# Patient Record
Sex: FEMALE | Race: WHITE | NOT HISPANIC OR LATINO | URBAN - METROPOLITAN AREA
[De-identification: names, ages, dates, MRNs, and addresses within clinical notes are randomized per-mention and may not be internally consistent; named-entity substitution may affect disease eponyms.]

---

## 2024-06-14 ENCOUNTER — OFFICE VISIT (OUTPATIENT)
Dept: HEMATOLOGY/ONCOLOGY | Facility: CLINIC | Age: 64
End: 2024-06-14
Payer: COMMERCIAL

## 2024-06-14 VITALS
TEMPERATURE: 98.1 F | HEART RATE: 85 BPM | HEIGHT: 65 IN | BODY MASS INDEX: 28.21 KG/M2 | SYSTOLIC BLOOD PRESSURE: 131 MMHG | RESPIRATION RATE: 16 BRPM | OXYGEN SATURATION: 95 % | DIASTOLIC BLOOD PRESSURE: 75 MMHG | WEIGHT: 169.31 LBS

## 2024-06-14 DIAGNOSIS — I82.401 DEEP VEIN THROMBOSIS (DVT) OF RIGHT LOWER EXTREMITY, UNSPECIFIED CHRONICITY, UNSPECIFIED VEIN (MULTI): Primary | ICD-10-CM

## 2024-06-14 PROCEDURE — 1159F MED LIST DOCD IN RCRD: CPT | Performed by: INTERNAL MEDICINE

## 2024-06-14 PROCEDURE — 99205 OFFICE O/P NEW HI 60 MIN: CPT | Performed by: INTERNAL MEDICINE

## 2024-06-14 PROCEDURE — 1126F AMNT PAIN NOTED NONE PRSNT: CPT | Performed by: INTERNAL MEDICINE

## 2024-06-14 PROCEDURE — 99215 OFFICE O/P EST HI 40 MIN: CPT | Performed by: INTERNAL MEDICINE

## 2024-06-14 RX ORDER — LEVOTHYROXINE SODIUM 112 UG/1
CAPSULE ORAL DAILY
COMMUNITY

## 2024-06-14 ASSESSMENT — COLUMBIA-SUICIDE SEVERITY RATING SCALE - C-SSRS
6. HAVE YOU EVER DONE ANYTHING, STARTED TO DO ANYTHING, OR PREPARED TO DO ANYTHING TO END YOUR LIFE?: NO
1. IN THE PAST MONTH, HAVE YOU WISHED YOU WERE DEAD OR WISHED YOU COULD GO TO SLEEP AND NOT WAKE UP?: NO
2. HAVE YOU ACTUALLY HAD ANY THOUGHTS OF KILLING YOURSELF?: NO

## 2024-06-14 ASSESSMENT — PATIENT HEALTH QUESTIONNAIRE - PHQ9
2. FEELING DOWN, DEPRESSED OR HOPELESS: NOT AT ALL
SUM OF ALL RESPONSES TO PHQ9 QUESTIONS 1 AND 2: 0
1. LITTLE INTEREST OR PLEASURE IN DOING THINGS: NOT AT ALL

## 2024-06-14 ASSESSMENT — PAIN SCALES - GENERAL: PAINLEVEL: 0-NO PAIN

## 2024-06-14 NOTE — PROGRESS NOTES
"Patient ID: : Ashlyn Vance            Primary Care Provider: Conor Shen MD    LOCATION:  [facility]    REFERRING PHYSICIAN:  Conor Shen MD    HEMATOLOGY ONCOLOGY PROBLEMS:  Unprovoked, left lower leg (popliteal, tibial, peroneal) vein DVT in 2024    CHIEF COMPLAINTS:  Patient is in the clinic today for evaluation of DVT    HISTORY:  Ashlyn Vance is a 64 y.o. female with a history of unprovoked left lower leg DVT.  She started having problems with fairly sudden onset of ankle and calf swelling in 2024.  Further evaluation including an ultrasound from 2024 done at [facility] was consists from a tree of left popliteal, tibial and peroneal vein DVT.  She has been maintained on anticoagulation with Eliquis since then.  There was no history of any recent surgeries, medication changes, decrease physical activity, travels or immobility.  Family history as such as unremarkable.  Patient herself never had any previous issues with thromboembolic complication all her life.    INTERVAL HISTORY:  Patient denies any new complaints.  Left leg swelling and discomfort is improved.  She is tolerating Eliquis well.  No history of nausea, vomiting, fever, night sweats, diarrhea, rash, anorexia or weight loss. No recent changes in medications.    PAST MEDICAL HISTORY:  1.  Left lower leg DVT as detailed above.  2.  Hypothyroidism  3.  Hyperlipidemia  4.  History of tubal ligation.    SOCIAL HISTORY:   for 45 years and lives in [place].  Non-smoker.  Social alcohol intake.  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  Born and raised in [place].    FAMILY HISTORY:  Father  in his 40s from myocardial infarction.  Mother  in her 70s from throat cancer.  1 sister has a history of A-fib and is on Coumadin.  2 children and 6 grandkids all alive and well.  One of her nephew has a history of \"sticky blood\" -  Exact details are not " "available.  No other family history of bleeding, clotting or malignant disorders in the family history.    REVIEW OF SYSTEMS:   Pertinent finding as per the history above.  All other systems have been reviewed and generally negative and noncontributory.    VITAL SIGNS  BSA: 1.88 meters squared  /75   Pulse 85   Temp 36.7 °C (98.1 °F)   Resp 16   Ht 1.654 m (5' 5.12\")   Wt 76.8 kg (169 lb 5 oz)   SpO2 95%   BMI 28.07 kg/m²     PHYSICAL EXAMINATION:  Detailed physical examination not done.    LAB DATA:  No results available but as per the patient she has been noted to have normal yearly routine health maintenance blood work all along.    RADIOLOGICAL DATA:  Doppler ultrasound results from 4/18/2024 from [facility] were reviewed and have been detailed in the history above.    ASSESSMENT / PLAN:  Unprovoked, left lower leg (popliteal, tibial, peroneal) vein DVT in April 2024.  Please refer the details  as outlined above.  In summary, patient with finding of unexplained left lower leg DVT as outlined above.  There was no history of recent surgeries, procedures, immobility, prolonged travels, medication changes etc.  Family history is unremarkable.  She is maintained on Eliquis without any problems.    Long discussion with the patient and she is explained that for now we will have to maintain her on anticoagulation for at least about 2 more months.  Thereafter we will recheck the Doppler ultrasound and if there is significant improvement in the clot burden then we will try to hold Eliquis for few days to check complete hypercoagulable workup.  She is explained that anticoagulation is preventing clot progression or development of any new thromboembolic issues but body own antifibrinolytic system will dissolve the clot over time, (3-6 months).  In view of her history of unprovoked unexplained DVT, it is important to rule out complete hypercoagulable workup.  But those labs showed only be checked " in the absence of acute thromboembolic episodes and without anticoagulation.  Depending upon her clinical course and the results of the hypercoagulable workup, we will decide about the need for long-term anticoagulation.    2.  Follow-up.  She will be scheduled for follow-up Doppler ultrasound in about 2 months.  Depending upon the results we will plan further management accordingly as outlined above.    A total of 60 minutes were spent in evaluation - performing physical examination, history taking, review of the previous extensive records/information and formulating current and future management plan. Majority of the time was spend in consultation and discussion.    This note has been transcribed using Dragon voice recognition system and there is a possibility of unintentional typing misprints.

## 2024-08-14 ENCOUNTER — HOSPITAL ENCOUNTER (OUTPATIENT)
Dept: VASCULAR MEDICINE | Facility: HOSPITAL | Age: 64
Discharge: HOME | End: 2024-08-14
Payer: COMMERCIAL

## 2024-08-14 DIAGNOSIS — I82.531: ICD-10-CM

## 2024-08-14 DIAGNOSIS — I82.401 DEEP VEIN THROMBOSIS (DVT) OF RIGHT LOWER EXTREMITY, UNSPECIFIED CHRONICITY, UNSPECIFIED VEIN (MULTI): ICD-10-CM

## 2024-08-14 PROCEDURE — 93970 EXTREMITY STUDY: CPT

## 2024-08-14 PROCEDURE — 93970 EXTREMITY STUDY: CPT | Performed by: INTERNAL MEDICINE

## 2024-08-26 DIAGNOSIS — I82.401 DEEP VEIN THROMBOSIS (DVT) OF RIGHT LOWER EXTREMITY, UNSPECIFIED CHRONICITY, UNSPECIFIED VEIN (MULTI): Primary | ICD-10-CM

## 2024-08-28 ENCOUNTER — TELEPHONE (OUTPATIENT)
Dept: HEMATOLOGY/ONCOLOGY | Facility: CLINIC | Age: 64
End: 2024-08-28
Payer: COMMERCIAL

## 2024-08-28 DIAGNOSIS — I82.401 DEEP VEIN THROMBOSIS (DVT) OF RIGHT LOWER EXTREMITY, UNSPECIFIED CHRONICITY, UNSPECIFIED VEIN (MULTI): Primary | ICD-10-CM

## 2024-08-28 NOTE — TELEPHONE ENCOUNTER
Pt called to review her most recent scan and what is needed at this time. Per Dr. Levine the pt should continue to take her prescribed blood thinner and repeat doppler in 3 months. She will also need to schedule a follow up with Dr. Levine shortly after the November doppler. Pt verbalizes understanding and will call with any other questions or concerns.

## 2024-10-02 DIAGNOSIS — I82.401 DEEP VEIN THROMBOSIS (DVT) OF RIGHT LOWER EXTREMITY, UNSPECIFIED CHRONICITY, UNSPECIFIED VEIN (MULTI): Primary | ICD-10-CM

## 2024-11-18 PROBLEM — R87.619 ABNORMAL PAP SMEAR OF CERVIX: Status: ACTIVE | Noted: 2024-11-18

## 2024-11-18 RX ORDER — CHOLECALCIFEROL (VITAMIN D3) 125 MCG
CAPSULE ORAL
COMMUNITY

## 2024-11-18 RX ORDER — MULTIVITAMIN
TABLET ORAL
COMMUNITY

## 2024-12-02 ENCOUNTER — HOSPITAL ENCOUNTER (OUTPATIENT)
Dept: VASCULAR MEDICINE | Facility: HOSPITAL | Age: 64
Discharge: HOME | End: 2024-12-02
Payer: MEDICARE

## 2024-12-02 DIAGNOSIS — I82.401 DEEP VEIN THROMBOSIS (DVT) OF RIGHT LOWER EXTREMITY, UNSPECIFIED CHRONICITY, UNSPECIFIED VEIN (MULTI): ICD-10-CM

## 2024-12-02 DIAGNOSIS — I82.531 CHRONIC EMBOLISM AND THROMBOSIS OF RIGHT POPLITEAL VEIN: ICD-10-CM

## 2024-12-02 PROCEDURE — 93970 EXTREMITY STUDY: CPT

## 2024-12-02 PROCEDURE — 93970 EXTREMITY STUDY: CPT | Performed by: INTERNAL MEDICINE

## 2024-12-09 ENCOUNTER — APPOINTMENT (OUTPATIENT)
Dept: HEMATOLOGY/ONCOLOGY | Facility: CLINIC | Age: 64
End: 2024-12-09
Payer: COMMERCIAL

## 2024-12-11 ENCOUNTER — OFFICE VISIT (OUTPATIENT)
Dept: HEMATOLOGY/ONCOLOGY | Facility: CLINIC | Age: 64
End: 2024-12-11
Payer: MEDICARE

## 2024-12-11 VITALS
TEMPERATURE: 97.3 F | RESPIRATION RATE: 20 BRPM | SYSTOLIC BLOOD PRESSURE: 142 MMHG | OXYGEN SATURATION: 97 % | BODY MASS INDEX: 29.97 KG/M2 | WEIGHT: 180.78 LBS | DIASTOLIC BLOOD PRESSURE: 86 MMHG | HEART RATE: 82 BPM

## 2024-12-11 DIAGNOSIS — I82.401 DEEP VEIN THROMBOSIS (DVT) OF RIGHT LOWER EXTREMITY, UNSPECIFIED CHRONICITY, UNSPECIFIED VEIN (MULTI): Primary | ICD-10-CM

## 2024-12-11 PROCEDURE — 99213 OFFICE O/P EST LOW 20 MIN: CPT | Performed by: INTERNAL MEDICINE

## 2024-12-11 PROCEDURE — 1159F MED LIST DOCD IN RCRD: CPT | Performed by: INTERNAL MEDICINE

## 2024-12-11 PROCEDURE — 1036F TOBACCO NON-USER: CPT | Performed by: INTERNAL MEDICINE

## 2024-12-11 PROCEDURE — 1126F AMNT PAIN NOTED NONE PRSNT: CPT | Performed by: INTERNAL MEDICINE

## 2024-12-11 ASSESSMENT — PAIN SCALES - GENERAL: PAINLEVEL_OUTOF10: 0-NO PAIN

## 2024-12-11 NOTE — PROGRESS NOTES
"Patient ID: : Ashlyn Vance            Primary Care Provider: Conor Shen MD    LOCATION:  [facility]    HEMATOLOGY ONCOLOGY PROBLEMS:  Unprovoked, left lower leg (popliteal, tibial, peroneal) vein DVT in 2024    CHIEF COMPLAINTS:  Patient is in the clinic today for follow-up of DVT.    HISTORY:  Ashlyn Vance is a 64 y.o. female with a history of unprovoked left lower leg DVT.  She started having problems with fairly sudden onset of ankle and calf swelling in 2024.  Further evaluation including an ultrasound from 2024 done at [facility] was confirmatory of left popliteal, tibial and peroneal vein DVT.  She has been maintained on anticoagulation with Eliquis since then.  There was no history of any recent surgeries, medication changes, decrease physical activity, travels or immobility.  Family history as such as unremarkable.  Patient herself never had any previous issues with thromboembolic complication all her life.    INTERVAL HISTORY:  Patient denies any new complaints.  Left leg swelling and discomfort has improved.  She is tolerating Eliquis well.  Latest follow-up Doppler ultrasound showed complete resolution of previous DVT.  No history of nausea, vomiting, fever, night sweats, diarrhea, rash, anorexia or weight loss. No recent changes in medications.    PAST MEDICAL HISTORY:  1.  Left lower leg DVT as detailed above.  2.  Hypothyroidism  3.  Hyperlipidemia  4.  History of tubal ligation.    SOCIAL HISTORY:   for 45 years and lives in [place].  Non-smoker.  Social alcohol intake.  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  Born and raised in [place].    FAMILY HISTORY:  Father  in his 40s from myocardial infarction.  Mother  in her 70s from throat cancer.  1 sister has a history of A-fib and is on Coumadin.  2 children and 6 grandkids all alive and well.  One of her nephew has a history of \"sticky blood\" -  " Exact details are not available.  No other family history of bleeding, clotting or malignant disorders in the family history.    REVIEW OF SYSTEMS:   Pertinent finding as per the history above.  All other systems have been reviewed and generally negative and noncontributory.    VITAL SIGNS  BSA: 1.94 meters squared  /86   Pulse 82   Temp 36.3 °C (97.3 °F)   Resp 20   Wt 82 kg (180 lb 12.4 oz)   SpO2 97%   BMI 29.97 kg/m²     PHYSICAL EXAMINATION:  Detailed physical examination not done.    LAB DATA:  No results available but as per the patient she has been noted to have normal yearly routine health maintenance blood work all along.    RADIOLOGICAL DATA:  Doppler ultrasound 12/02/2024.  CONCLUSIONS:  Right Lower Venous Insufficiency: There is reflux noted in the popliteal vein.  Right Lower Venous: No evidence of acute deep vein thrombus visualized in the right lower extremity. There are chronic changes visualized in the popliteal, posterior tibial and peroneal veins.  Left Lower Venous: No evidence of acute deep vein thrombus visualized in the left lower extremity. Cannot rule out thrombus in non-visualized peroneal vein due to edema.  Comparison:  Compared with study from 8/14/2024, Chronic clot previously documented at Left POP V is not longer visualized. No significant changes to Right.    ASSESSMENT / PLAN:  Unprovoked, left lower leg (popliteal, tibial, peroneal) vein DVT in April 2024.  Please refer the details  as outlined above.  In summary, patient with finding of unexplained left lower leg DVT as outlined above.  There was no history of recent surgeries, procedures, immobility, prolonged travels, medication changes etc.  Family history is unremarkable.  She is maintained on Eliquis without any problems.    Long discussion with the patient and she is reassured that latest follow-up Doppler ultrasound is suggestive of complete resolution of previously known DVT.  At this time we will try to hold  Eliquis for few days to check complete hypercoagulable workup.  Depending upon the hypercoagulable workup, we will decide about the need for long-term anticoagulation.    2.  Follow-up.  She will come to the clinic early next week for hypercoagulable blood work check and depending upon the results we will contact her accordingly.    This note has been transcribed using Dragon voice recognition system and there is a possibility of unintentional typing misprints.

## 2024-12-16 ENCOUNTER — LAB (OUTPATIENT)
Dept: LAB | Facility: CLINIC | Age: 64
End: 2024-12-16
Payer: MEDICARE

## 2024-12-16 DIAGNOSIS — I82.401 DEEP VEIN THROMBOSIS (DVT) OF RIGHT LOWER EXTREMITY, UNSPECIFIED CHRONICITY, UNSPECIFIED VEIN (MULTI): ICD-10-CM

## 2024-12-16 LAB
ALBUMIN SERPL BCP-MCNC: 4 G/DL (ref 3.4–5)
ALP SERPL-CCNC: 60 U/L (ref 33–136)
ALT SERPL W P-5'-P-CCNC: 19 U/L (ref 7–45)
ANION GAP SERPL CALC-SCNC: 11 MMOL/L (ref 10–20)
AST SERPL W P-5'-P-CCNC: 20 U/L (ref 9–39)
B2 GLYCOPROT1 IGA SER-ACNC: 3.2 U/ML
B2 GLYCOPROT1 IGG SER-ACNC: <1.4 U/ML
B2 GLYCOPROT1 IGM SER-ACNC: 1.5 U/ML
BASOPHILS # BLD AUTO: 0.07 X10*3/UL (ref 0–0.1)
BASOPHILS NFR BLD AUTO: 1.2 %
BILIRUB SERPL-MCNC: 0.4 MG/DL (ref 0–1.2)
BUN SERPL-MCNC: 24 MG/DL (ref 6–23)
CALCIUM SERPL-MCNC: 9.2 MG/DL (ref 8.6–10.3)
CARDIOLIPIN IGA SERPL-ACNC: 5.3 APL U/ML
CARDIOLIPIN IGG SER IA-ACNC: <1.6 GPL U/ML
CARDIOLIPIN IGM SER IA-ACNC: 1.9 MPL U/ML
CHLORIDE SERPL-SCNC: 102 MMOL/L (ref 98–107)
CO2 SERPL-SCNC: 29 MMOL/L (ref 21–32)
CREAT SERPL-MCNC: 0.72 MG/DL (ref 0.5–1.05)
EGFRCR SERPLBLD CKD-EPI 2021: >90 ML/MIN/1.73M*2
EOSINOPHIL # BLD AUTO: 0.42 X10*3/UL (ref 0–0.7)
EOSINOPHIL NFR BLD AUTO: 7 %
ERYTHROCYTE [DISTWIDTH] IN BLOOD BY AUTOMATED COUNT: 14.9 % (ref 11.5–14.5)
GLUCOSE SERPL-MCNC: 84 MG/DL (ref 74–99)
HCT VFR BLD AUTO: 34.2 % (ref 36–46)
HGB BLD-MCNC: 10.7 G/DL (ref 12–16)
IMM GRANULOCYTES # BLD AUTO: 0.01 X10*3/UL (ref 0–0.7)
IMM GRANULOCYTES NFR BLD AUTO: 0.2 % (ref 0–0.9)
LYMPHOCYTES # BLD AUTO: 1.66 X10*3/UL (ref 1.2–4.8)
LYMPHOCYTES NFR BLD AUTO: 27.7 %
MCH RBC QN AUTO: 28.5 PG (ref 26–34)
MCHC RBC AUTO-ENTMCNC: 31.3 G/DL (ref 32–36)
MCV RBC AUTO: 91 FL (ref 80–100)
MONOCYTES # BLD AUTO: 0.55 X10*3/UL (ref 0.1–1)
MONOCYTES NFR BLD AUTO: 9.2 %
NEUTROPHILS # BLD AUTO: 3.28 X10*3/UL (ref 1.2–7.7)
NEUTROPHILS NFR BLD AUTO: 54.7 %
NRBC BLD-RTO: 0 /100 WBCS (ref 0–0)
PLATELET # BLD AUTO: 404 X10*3/UL (ref 150–450)
POTASSIUM SERPL-SCNC: 4.3 MMOL/L (ref 3.5–5.3)
PROT SERPL-MCNC: 6.3 G/DL (ref 6.4–8.2)
RBC # BLD AUTO: 3.76 X10*6/UL (ref 4–5.2)
SODIUM SERPL-SCNC: 138 MMOL/L (ref 136–145)
WBC # BLD AUTO: 6 X10*3/UL (ref 4.4–11.3)

## 2024-12-16 PROCEDURE — 85303 CLOT INHIBIT PROT C ACTIVITY: CPT | Mod: PARLAB

## 2024-12-16 PROCEDURE — 85613 RUSSELL VIPER VENOM DILUTED: CPT | Mod: PARLAB

## 2024-12-16 PROCEDURE — 84075 ASSAY ALKALINE PHOSPHATASE: CPT

## 2024-12-16 PROCEDURE — 85302 CLOT INHIBIT PROT C ANTIGEN: CPT

## 2024-12-16 PROCEDURE — 36415 COLL VENOUS BLD VENIPUNCTURE: CPT

## 2024-12-16 PROCEDURE — 86147 CARDIOLIPIN ANTIBODY EA IG: CPT | Mod: PARLAB

## 2024-12-16 PROCEDURE — 85025 COMPLETE CBC W/AUTO DIFF WBC: CPT

## 2024-12-16 PROCEDURE — 85306 CLOT INHIBIT PROT S FREE: CPT | Mod: PARLAB

## 2024-12-16 PROCEDURE — 86146 BETA-2 GLYCOPROTEIN ANTIBODY: CPT | Mod: PARLAB

## 2024-12-16 PROCEDURE — 85301 ANTITHROMBIN III ANTIGEN: CPT

## 2024-12-17 LAB
PROT C ACT/NOR PPP CHRO: 142 % ACTIVITY (ref 70–150)
PROT S ACT/NOR PPP: 22 % ACTIVITY (ref 64–150)
PROT S FREE AG ACT/NOR PPP IA: 17 % (ref 55–124)

## 2024-12-18 LAB — AT III AG ACT/NOR PPP IA: 93 % (ref 82–136)

## 2024-12-19 LAB
DRVVT SCREEN TO CONFIRM RATIO: 0.81 RATIO
DRVVT/DRVVT CFM NRMLZD PPP-RTO: 0.91 RATIO
DRVVT/DRVVT CFM P DOAC NEUT NORM PPP-RTO: 0.9 RATIO
PROT C AG ACT/NOR PPP IA: >95 % (ref 63–153)

## 2024-12-24 ENCOUNTER — TELEPHONE (OUTPATIENT)
Dept: HEMATOLOGY/ONCOLOGY | Facility: CLINIC | Age: 64
End: 2024-12-24
Payer: MEDICARE

## 2024-12-24 DIAGNOSIS — I82.401 DEEP VEIN THROMBOSIS (DVT) OF RIGHT LOWER EXTREMITY, UNSPECIFIED CHRONICITY, UNSPECIFIED VEIN (MULTI): ICD-10-CM

## 2024-12-24 NOTE — TELEPHONE ENCOUNTER
Patient aware that we also found the protein s deficiency that she previously was told she has. Per Dr. Levine she was advised to continue on Eliquis. Concerns with cost of this medication. Will try [facility] pharmacy for refill and she will let us know if they are able to assist with cost better.

## 2024-12-24 NOTE — TELEPHONE ENCOUNTER
Message left for patient to call me back to discuss lab results. Awaiting return call. Left my direct phone number.

## 2025-01-06 ENCOUNTER — SPECIALTY PHARMACY (OUTPATIENT)
Dept: PHARMACY | Facility: CLINIC | Age: 65
End: 2025-01-06

## 2025-01-06 ENCOUNTER — SOCIAL WORK (OUTPATIENT)
Dept: HEMATOLOGY/ONCOLOGY | Facility: CLINIC | Age: 65
End: 2025-01-06
Payer: MEDICARE

## 2025-01-06 ENCOUNTER — TELEPHONE (OUTPATIENT)
Dept: HEMATOLOGY/ONCOLOGY | Facility: CLINIC | Age: 65
End: 2025-01-06
Payer: MEDICARE

## 2025-01-06 DIAGNOSIS — I82.401 DEEP VEIN THROMBOSIS (DVT) OF RIGHT LOWER EXTREMITY, UNSPECIFIED CHRONICITY, UNSPECIFIED VEIN (MULTI): ICD-10-CM

## 2025-01-06 NOTE — TELEPHONE ENCOUNTER
Pt unable to afford the $500 + monthly cost at [facility] for Eliquis; [employer] [place] gave some different options for trying for assistance. Asked Dr. Levine to forward script to  Specialty to see if they have grants she may qualify for.  Patient agreeable to this initial plan.

## 2025-01-06 NOTE — PROGRESS NOTES
RN partner Brandy indicated pt has been prescribed eliquis but after checking with her pharmacy and running through coverage she owes over $500+. RN partner and Onc ANNS called pt. Onc TASHI-S reviewed options for assistance with medication. Let pt know that she would need to reach 3% of her household income out of pocket first for the year prior to assist with medication. Pt is aware what that cost would be up front. Let pt know after checking income that they would not qualify for Medicare extra help due to their income being over the guidelines. Offered as well to see if [facility] could assist with their cassi. RN partner also discussed coumadin for pt but pt does not seem to want to go on that. Pt asking to see if [facility] will be able to help her with the cost of the medication as the first step.